# Patient Record
Sex: FEMALE | Race: WHITE | NOT HISPANIC OR LATINO | Employment: FULL TIME | URBAN - METROPOLITAN AREA
[De-identification: names, ages, dates, MRNs, and addresses within clinical notes are randomized per-mention and may not be internally consistent; named-entity substitution may affect disease eponyms.]

---

## 2019-10-25 ENCOUNTER — TELEPHONE (OUTPATIENT)
Dept: FAMILY MEDICINE CLINIC | Facility: CLINIC | Age: 24
End: 2019-10-25

## 2019-11-06 ENCOUNTER — OFFICE VISIT (OUTPATIENT)
Dept: FAMILY MEDICINE CLINIC | Facility: CLINIC | Age: 24
End: 2019-11-06

## 2019-11-06 VITALS
SYSTOLIC BLOOD PRESSURE: 118 MMHG | DIASTOLIC BLOOD PRESSURE: 80 MMHG | HEIGHT: 69 IN | WEIGHT: 135 LBS | BODY MASS INDEX: 19.99 KG/M2 | OXYGEN SATURATION: 99 % | HEART RATE: 76 BPM

## 2019-11-06 DIAGNOSIS — F33.0 MILD EPISODE OF RECURRENT MAJOR DEPRESSIVE DISORDER (HCC): ICD-10-CM

## 2019-11-06 DIAGNOSIS — Z00.00 ROUTINE ADULT HEALTH MAINTENANCE: Primary | ICD-10-CM

## 2019-11-06 PROBLEM — Z91.018 FOOD ALLERGY: Status: ACTIVE | Noted: 2019-11-06

## 2019-11-06 PROBLEM — J30.1 SEASONAL ALLERGIC RHINITIS DUE TO POLLEN: Status: ACTIVE | Noted: 2019-11-06

## 2019-11-06 PROCEDURE — 99385 PREV VISIT NEW AGE 18-39: CPT | Performed by: FAMILY MEDICINE

## 2019-11-06 RX ORDER — FLUTICASONE PROPIONATE 50 MCG
1 SPRAY, SUSPENSION (ML) NASAL DAILY
COMMUNITY

## 2019-11-06 RX ORDER — ESCITALOPRAM OXALATE 10 MG/1
10 TABLET ORAL DAILY
Qty: 30 TABLET | Refills: 5 | Status: SHIPPED | OUTPATIENT
Start: 2019-11-06 | End: 2020-04-26

## 2019-11-06 RX ORDER — POLYETHYLENE GLYCOL 3350 17 G/17G
17 POWDER, FOR SOLUTION ORAL DAILY PRN
Qty: 1 G | Refills: 0
Start: 2019-11-06

## 2019-11-06 NOTE — PROGRESS NOTES
"  Chief Complaint   Patient presents with   • Annual Exam       Subjective   Gudelia Estrella is a 24 y.o. female and is here for a yearly physical exam. The patient reports problems - depression is worse.      Depression: Patient complains of depression. She complains of depressed mood. Onset was approximately 10 years ago, unchanged since that time.  She denies current suicidal and homicidal plan or intent.   Family history significant for depression.Possible organic causes contributing are: none.  Risk factors: none Previous treatment includes none and individual therapy. She complains of the following side effects from the treatment: none   Had depression since high school  Is seeing a therapist for past few years    Now   Working on her masters..    Do you take any herbs or supplements that were not prescribed by a doctor? yes. If so, these will be added to active medication list.    The following portions of the patient's history were reviewed and updated as appropriate: allergies, current medications, past family history, past medical history, past social history, past surgical history and problem list.    Social and Family and Surgical History reviewed and updated today, see Rooming tab.    Health History, Preventive Measures and Vaccination flow sheets reviewed and updated today.    Patient's current medical chart in Epic; including previous office notes, imaging, labs, specialist's evaluation either in notes or in Media tab reviewed today.    Other pertinent medical information also reviewed thru Care Everywhere function is also reviewed today.    Review of Systems  Review of Systems  A comprehensive review of systems was negative except for: Allergic/Immunologic: positive for hay fever    Vitals:    11/06/19 1028   BP: 118/80   BP Location: Left arm   Patient Position: Sitting   Cuff Size: Adult   Pulse: 76   SpO2: 99%   Weight: 61.2 kg (135 lb)   Height: 175.3 cm (69\")       General " Appearance:  Alert, cooperative, no distress, appears stated age   Head:  Normocephalic, without obvious abnormality, atraumatic   Eyes:  PERRL, conjunctiva/corneas clear, EOM's intact.   Ears:  Normal TM's and external ear canals, both ears   Nose: Nares normal, septum midline, mucosa normal, no drainage or sinus tenderness   Throat: Lips, mucosa, and tongue normal; teeth and gums normal   Neck: Supple, symmetrical, trachea midline, no adenopathy;   thyroid: No enlargement/tenderness/nodules; no carotid  bruit   Back:  Symmetric, no curvature, ROM normal, no CVA tenderness   Lungs:  Clear to auscultation bilaterally, respirations unlabored   Chest wall:  No tenderness or deformity   Heart:  Regular rate and rhythm, S1 and S2 normal, no murmur, rub or gallop   Abdomen:  Soft, non-tender, bowel sounds active all four quadrants,   no masses, no organomegaly   Rectal:        Extremities: Extremities normal, atraumatic, no cyanosis or edema   Pulses: 2+ and symmetric all extremities   Skin: Skin color, texture, turgor normal, no rashes or lesions   Lymph nodes: Cervical, supraclavicular, and axillary nodes normal   Neurologic: CNII-XII intact. Normal strength, sensation and reflexes   throughout            Assessment/Plan   Healthy female exam.  Gudelia was seen today for annual exam.    Diagnoses and all orders for this visit:    Routine adult health maintenance  -     CBC (No Diff)  -     Comprehensive Metabolic Panel  -     Lipid Panel  -     TSH    Mild episode of recurrent major depressive disorder (CMS/HCC)  -     CBC (No Diff)  -     Comprehensive Metabolic Panel  -     Lipid Panel  -     TSH  -     escitalopram (LEXAPRO) 10 MG tablet; Take 1 tablet by mouth Daily.    Other orders  -     polyethylene glycol (MIRALAX) powder; Take 17 g by mouth Daily As Needed (.).      1. Start lexapro today  2. Patient Counseling:  --Nutrition: Stressed importance of moderation in sodium/caffeine intake, saturated fat and  cholesterol.  Discussed caloric balance, sufficient intake of fresh fruits, vegetables, fiber, calcium, iron.  --  --Exercise: Stressed the importance of regular exercise.   --Substance Abuse: Discussed cessation/primary prevention of tobacco, alcohol, or other drug use; driving or other dangerous activities under the influence.    --Dental health: Discussed importance of regular tooth brushing, flossing, and dental visits.  -- suggested having eyes and vision checked if needed or past due.  --Immunizations reviewed.  --  3. Discussed the patient's BMI with her.  The BMI is in the acceptable range  4. Follow up in 1 month    There are no Patient Instructions on file for this visit.    Medications Discontinued During This Encounter   Medication Reason   • polyethylene glycol (MIRALAX) powder         Dr. Enrike Frenando MD  [facility] [place], [place]  [facility]

## 2019-11-07 LAB
ALBUMIN SERPL-MCNC: 5 G/DL (ref 3.5–5.5)
ALBUMIN/GLOB SERPL: 2 {RATIO} (ref 1.2–2.2)
ALP SERPL-CCNC: 51 IU/L (ref 39–117)
ALT SERPL-CCNC: 19 IU/L (ref 0–32)
AST SERPL-CCNC: 19 IU/L (ref 0–40)
BILIRUB SERPL-MCNC: 0.3 MG/DL (ref 0–1.2)
BUN SERPL-MCNC: 9 MG/DL (ref 6–20)
BUN/CREAT SERPL: 14 (ref 9–23)
CALCIUM SERPL-MCNC: 9.8 MG/DL (ref 8.7–10.2)
CHLORIDE SERPL-SCNC: 103 MMOL/L (ref 96–106)
CHOLEST SERPL-MCNC: 166 MG/DL (ref 100–199)
CO2 SERPL-SCNC: 24 MMOL/L (ref 20–29)
CREAT SERPL-MCNC: 0.63 MG/DL (ref 0.57–1)
ERYTHROCYTE [DISTWIDTH] IN BLOOD BY AUTOMATED COUNT: 12.4 % (ref 12.3–15.4)
GLOBULIN SER CALC-MCNC: 2.5 G/DL (ref 1.5–4.5)
GLUCOSE SERPL-MCNC: 84 MG/DL (ref 65–99)
HCT VFR BLD AUTO: 38.8 % (ref 34–46.6)
HDLC SERPL-MCNC: 72 MG/DL
HGB BLD-MCNC: 13.9 G/DL (ref 11.1–15.9)
LDLC SERPL CALC-MCNC: 76 MG/DL (ref 0–99)
MCH RBC QN AUTO: 31.4 PG (ref 26.6–33)
MCHC RBC AUTO-ENTMCNC: 35.8 G/DL (ref 31.5–35.7)
MCV RBC AUTO: 88 FL (ref 79–97)
PLATELET # BLD AUTO: 230 X10E3/UL (ref 150–450)
POTASSIUM SERPL-SCNC: 4.2 MMOL/L (ref 3.5–5.2)
PROT SERPL-MCNC: 7.5 G/DL (ref 6–8.5)
RBC # BLD AUTO: 4.43 X10E6/UL (ref 3.77–5.28)
SODIUM SERPL-SCNC: 141 MMOL/L (ref 134–144)
TRIGL SERPL-MCNC: 92 MG/DL (ref 0–149)
TSH SERPL DL<=0.005 MIU/L-ACNC: 2.76 UIU/ML (ref 0.45–4.5)
VLDLC SERPL CALC-MCNC: 18 MG/DL (ref 5–40)
WBC # BLD AUTO: 3.4 X10E3/UL (ref 3.4–10.8)

## 2019-12-19 ENCOUNTER — OFFICE VISIT (OUTPATIENT)
Dept: FAMILY MEDICINE CLINIC | Facility: CLINIC | Age: 24
End: 2019-12-19

## 2019-12-19 VITALS
WEIGHT: 137 LBS | OXYGEN SATURATION: 99 % | HEART RATE: 77 BPM | HEIGHT: 69 IN | BODY MASS INDEX: 20.29 KG/M2 | SYSTOLIC BLOOD PRESSURE: 120 MMHG | DIASTOLIC BLOOD PRESSURE: 62 MMHG

## 2019-12-19 DIAGNOSIS — F33.0 MILD EPISODE OF RECURRENT MAJOR DEPRESSIVE DISORDER (HCC): Primary | ICD-10-CM

## 2019-12-19 PROCEDURE — 99213 OFFICE O/P EST LOW 20 MIN: CPT | Performed by: FAMILY MEDICINE

## 2019-12-19 NOTE — PROGRESS NOTES
Subjective   Gudelia Estrella is a 24 y.o. female who is here for   Chief Complaint   Patient presents with   • Depression     f/u lexapro - no complaints    .     History of Present Illness   Depression: Patient complains of depression. She complains of depressed mood. Onset was approximately several months ago, completely resolved since that time.  She denies current suicidal and homicidal plan or intent.   Family history significant for depression.Possible organic causes contributing are: none.  Risk factors: none Previous treatment includes Lexapro and medication. She complains of the following side effects from the treatment: none.  Feels much better  Happy with lexapro      The following portions of the patient's history were reviewed and updated as appropriate: allergies, current medications, past family history, past medical history, past social history, past surgical history and problem list.    Review of Systems    Objective   Physical Exam   Constitutional: She appears well-developed and well-nourished.   Cardiovascular: Normal rate.   Psychiatric: She has a normal mood and affect.   Nursing note and vitals reviewed.      Assessment/Plan   Gudelia was seen today for depression.    Diagnoses and all orders for this visit:    Mild episode of recurrent major depressive disorder (CMS/HCC)      There are no Patient Instructions on file for this visit.    There are no discontinued medications.     Return in about 6 months (around 6/19/2020) for Annual physical.    Dr. Enrike Fernando  [facility] [place], [place]

## 2020-01-24 ENCOUNTER — TELEPHONE (OUTPATIENT)
Dept: FAMILY MEDICINE CLINIC | Facility: CLINIC | Age: 25
End: 2020-01-24

## 2020-01-24 NOTE — TELEPHONE ENCOUNTER
Patient called to see if you would consider accepting her  as a new patient.  Gudelia was a re-established patient last year.  Her husbands name is Doc 07/22/1992  Ph. [phone]  Thanks

## 2020-02-20 ENCOUNTER — TELEPHONE (OUTPATIENT)
Dept: FAMILY MEDICINE CLINIC | Facility: CLINIC | Age: 25
End: 2020-02-20

## 2020-02-20 NOTE — TELEPHONE ENCOUNTER
Pt was sent home early from work due to 99.9 temperature, fluid in ears and nasal congestion. She is asking if something can be called in. Please advise

## 2020-03-30 ENCOUNTER — TELEPHONE (OUTPATIENT)
Dept: FAMILY MEDICINE CLINIC | Facility: CLINIC | Age: 25
End: 2020-03-30

## 2020-03-30 NOTE — TELEPHONE ENCOUNTER
Pt called complaining that since having to stay at home due to the coronavirus, she is having more anxiety and more trouble sleeping.  Please advise

## 2020-03-31 ENCOUNTER — TELEMEDICINE (OUTPATIENT)
Dept: FAMILY MEDICINE CLINIC | Facility: CLINIC | Age: 25
End: 2020-03-31

## 2020-03-31 DIAGNOSIS — F33.0 MILD EPISODE OF RECURRENT MAJOR DEPRESSIVE DISORDER (HCC): Primary | ICD-10-CM

## 2020-03-31 PROCEDURE — 99213 OFFICE O/P EST LOW 20 MIN: CPT | Performed by: FAMILY MEDICINE

## 2020-03-31 RX ORDER — TRAZODONE HYDROCHLORIDE 50 MG/1
50 TABLET ORAL NIGHTLY
Qty: 30 TABLET | Refills: 2 | Status: SHIPPED | OUTPATIENT
Start: 2020-03-31 | End: 2020-07-10

## 2020-03-31 NOTE — PROGRESS NOTES
Subjective   Gudelia Estrella is a 25 y.o. female who is here for   Chief Complaint   Patient presents with   • Anxiety     cant sleep   .     History of Present Illness   Anxiety: Patient complains of anxiety disorder and sleep disturbance.  She has the following symptoms: difficulty concentrating, insomnia. Onset of symptoms was approximately 1 week ago, gradually worsening since that time. She denies current suicidal and homicidal ideation. Family history significant for anxiety.Possible organic causes contributing are: none. 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  She complains of the following side effects from the treatment: none.        The following portions of the patient's history were reviewed and updated as appropriate: allergies, current medications, past family history, past medical history, past social history, past surgical history and problem list.    Review of Systems    Objective   Physical Exam  None due to video visit  Patient physically appears healthy    Assessment/Plan   Gudelia was seen today for anxiety.    Diagnoses and all orders for this visit:    Mild episode of recurrent major depressive disorder (CMS/[facility])  -     traZODone (DESYREL) 50 MG tablet; Take 1 tablet by mouth Every Night.    stay on lexapro  Add on trazadone    There are no Patient Instructions on file for this visit.    There are no discontinued medications.     No follow-ups on file.    Dr. Enrike Fernando  [facility] [place], [place]

## 2020-04-24 DIAGNOSIS — F33.0 MILD EPISODE OF RECURRENT MAJOR DEPRESSIVE DISORDER (HCC): ICD-10-CM

## 2020-04-26 RX ORDER — ESCITALOPRAM OXALATE 10 MG/1
TABLET ORAL
Qty: 90 TABLET | Refills: 1 | Status: SHIPPED | OUTPATIENT
Start: 2020-04-26 | End: 2020-10-30

## 2020-06-24 NOTE — TELEPHONE ENCOUNTER
Someone outside of this dept has ordered a right mamm. Left msg for mamm  to double check this as it appears she needs left mamm. Video visit tomorrow morning?

## 2020-07-07 DIAGNOSIS — F33.0 MILD EPISODE OF RECURRENT MAJOR DEPRESSIVE DISORDER (HCC): Primary | ICD-10-CM

## 2020-07-09 DIAGNOSIS — F33.0 MILD EPISODE OF RECURRENT MAJOR DEPRESSIVE DISORDER (HCC): ICD-10-CM

## 2020-07-10 RX ORDER — TRAZODONE HYDROCHLORIDE 50 MG/1
TABLET ORAL
Qty: 30 TABLET | Refills: 0 | Status: SHIPPED | OUTPATIENT
Start: 2020-07-10 | End: 2020-07-15 | Stop reason: SDUPTHER

## 2020-07-15 ENCOUNTER — OFFICE VISIT (OUTPATIENT)
Dept: FAMILY MEDICINE CLINIC | Facility: CLINIC | Age: 25
End: 2020-07-15

## 2020-07-15 VITALS
BODY MASS INDEX: 21.18 KG/M2 | DIASTOLIC BLOOD PRESSURE: 84 MMHG | HEART RATE: 82 BPM | OXYGEN SATURATION: 99 % | WEIGHT: 143 LBS | HEIGHT: 69 IN | SYSTOLIC BLOOD PRESSURE: 120 MMHG

## 2020-07-15 DIAGNOSIS — F33.1 MODERATE EPISODE OF RECURRENT MAJOR DEPRESSIVE DISORDER (HCC): Primary | ICD-10-CM

## 2020-07-15 DIAGNOSIS — F33.0 MILD EPISODE OF RECURRENT MAJOR DEPRESSIVE DISORDER (HCC): ICD-10-CM

## 2020-07-15 PROCEDURE — 99213 OFFICE O/P EST LOW 20 MIN: CPT | Performed by: FAMILY MEDICINE

## 2020-07-15 RX ORDER — BUSPIRONE HYDROCHLORIDE 15 MG/1
15 TABLET ORAL 3 TIMES DAILY
Qty: 30 TABLET | Refills: 1 | Status: SHIPPED | OUTPATIENT
Start: 2020-07-15 | End: 2020-08-31 | Stop reason: SDUPTHER

## 2020-07-15 RX ORDER — TRAZODONE HYDROCHLORIDE 50 MG/1
TABLET ORAL
Qty: 30 TABLET | Refills: 5 | Status: SHIPPED | OUTPATIENT
Start: 2020-07-15 | End: 2020-07-20 | Stop reason: SDUPTHER

## 2020-07-15 NOTE — PROGRESS NOTES
Subjective   Gudelia Estrella is a 25 y.o. female who is here for   Chief Complaint   Patient presents with   • Depression     med f/u - suicidal thoughts before menstrual cycle begings    .     History of Present Illness   Depression: Patient complains of depression. She complains of depressed mood, hopelessness and suicidal thoughts without plan. Onset was approximately 10 years ago, unchanged since that time.  She denies current suicidal and homicidal plan or intent.   Family history significant for anxiety and depression.Possible organic causes contributing are: none.  Risk factors: none Previous treatment includes Lexapro and individual therapy and medication. She complains of the following side effects from the treatment: none.  Lexapro over all is working well  But for years right before her menstrual cycle she has thought of death  She is fully aware of these feelings. As are all her family and friends and   She has no plan would not carry out any attempt.  Just asking for help prior to menstrual cycle    trazodone is working well at 1/2 the 50 mg pill. The full pill caused constipation.      The following portions of the patient's history were reviewed and updated as appropriate: allergies, current medications, past family history, past medical history, past social history, past surgical history and problem list.    Review of Systems    Objective   Physical Exam   Constitutional: She appears well-developed and well-nourished.   Cardiovascular: Normal rate.   Pulmonary/Chest: Effort normal.   Neurological: She is alert.   Psychiatric: She has a normal mood and affect.   Nursing note and vitals reviewed.      Assessment/Plan   Gudelia was seen today for depression.    Diagnoses and all orders for this visit:    Moderate episode of recurrent major depressive disorder (CMS/HCC)  -     busPIRone (BUSPAR) 15 MG tablet; Take 1 tablet by mouth 3 (Three) Times a Day.    Mild episode of recurrent major  depressive disorder (CMS/HCC)  -     traZODone (DESYREL) 50 MG tablet; Taking 1/2      There are no Patient Instructions on file for this visit.    Medications Discontinued During This Encounter   Medication Reason   • traZODone (DESYREL) 50 MG tablet Reorder        Return in about 1 month (around 8/15/2020) for new medication follow up.    Dr. Enrike Fernando  [place], [place]

## 2020-07-20 ENCOUNTER — TELEPHONE (OUTPATIENT)
Dept: FAMILY MEDICINE CLINIC | Facility: CLINIC | Age: 25
End: 2020-07-20

## 2020-07-20 DIAGNOSIS — F33.0 MILD EPISODE OF RECURRENT MAJOR DEPRESSIVE DISORDER (HCC): ICD-10-CM

## 2020-07-20 RX ORDER — TRAZODONE HYDROCHLORIDE 50 MG/1
TABLET ORAL
Qty: 30 TABLET | Refills: 5 | Status: SHIPPED | OUTPATIENT
Start: 2020-07-20 | End: 2021-02-26

## 2020-08-31 ENCOUNTER — TELEMEDICINE (OUTPATIENT)
Dept: FAMILY MEDICINE CLINIC | Facility: CLINIC | Age: 25
End: 2020-08-31

## 2020-08-31 DIAGNOSIS — F33.1 MODERATE EPISODE OF RECURRENT MAJOR DEPRESSIVE DISORDER (HCC): ICD-10-CM

## 2020-08-31 PROCEDURE — 99213 OFFICE O/P EST LOW 20 MIN: CPT | Performed by: FAMILY MEDICINE

## 2020-08-31 RX ORDER — BUSPIRONE HYDROCHLORIDE 15 MG/1
15 TABLET ORAL 3 TIMES DAILY
Qty: 30 TABLET | Refills: 5 | Status: SHIPPED | OUTPATIENT
Start: 2020-08-31 | End: 2021-02-26

## 2020-08-31 NOTE — PROGRESS NOTES
Subjective   Gudelia Estrella is a 25 y.o. female who is here for   Chief Complaint   Patient presents with   • Anxiety   • Depression   .     History of Present Illness   This is a Mychart Video medical encounter, occurring during the coronavirus COVID-19 pandemic of 2020.  Medical history from chart is reviewed.  Total face video time: 9  . Total chart time:12    We added Buspar last OV for pre menstrual depressive thought.    And that has worked very well  Wants to stay on the same.      The following portions of the patient's history were reviewed and updated as appropriate: allergies, current medications, past family history, past medical history, past social history, past surgical history and problem list.    Review of Systems    Objective   Physical Exam   Constitutional: No distress.   Pulmonary/Chest: No respiratory distress.   Neurological: She is alert.   Psychiatric: She has a normal mood and affect.       Assessment/Plan   Gudelia was seen today for anxiety and depression.    Diagnoses and all orders for this visit:    Moderate episode of recurrent major depressive disorder (CMS/HCC)  -     busPIRone (BUSPAR) 15 MG tablet; Take 1 tablet by mouth 3 (Three) Times a Day.      There are no Patient Instructions on file for this visit.    Medications Discontinued During This Encounter   Medication Reason   • busPIRone (BUSPAR) 15 MG tablet Reorder        Return in about 6 months (around 2/28/2021).    Dr. Enrike Fernando  [place], [place]

## 2020-10-29 DIAGNOSIS — F33.0 MILD EPISODE OF RECURRENT MAJOR DEPRESSIVE DISORDER (HCC): ICD-10-CM

## 2020-10-30 RX ORDER — ESCITALOPRAM OXALATE 10 MG/1
TABLET ORAL
Qty: 90 TABLET | Refills: 1 | Status: SHIPPED | OUTPATIENT
Start: 2020-10-30 | End: 2021-05-03

## 2021-02-26 ENCOUNTER — TELEMEDICINE (OUTPATIENT)
Dept: FAMILY MEDICINE CLINIC | Facility: CLINIC | Age: 26
End: 2021-02-26

## 2021-02-26 DIAGNOSIS — F33.1 MODERATE EPISODE OF RECURRENT MAJOR DEPRESSIVE DISORDER (HCC): Primary | ICD-10-CM

## 2021-02-26 DIAGNOSIS — Z3A.01 LESS THAN 8 WEEKS GESTATION OF PREGNANCY: ICD-10-CM

## 2021-02-26 PROCEDURE — 99213 OFFICE O/P EST LOW 20 MIN: CPT | Performed by: FAMILY MEDICINE

## 2021-02-26 NOTE — PROGRESS NOTES
Subjective   Gudelia Estrella is a 25 y.o. female who is here for   Chief Complaint   Patient presents with   • Depression   .     History of Present Illness   This is a Mychart Video medical encounter, occurring during the coronavirus COVID-19 pandemic of 2020.  Medical history from chart is reviewed. Audio visual encounter provided through a secure link via Zoom. Patient location is per their choice. Provider location is in my routine medical office in [facility]. Patient has given prior consent for this encounter, via check in process. Regarding EM coding: history will not be as robust and exam will be limited. Most EM coding decisions will be based on MDM and time.  Total face video time, 15 minutes . Total chart time pre and post chartin-20 minutes.    Video follow-up with Sophie today.  She is very excited she and her  are going to have their first baby.  She is within the first 2 months of pregnancy.  We will see her OB next month.  We discussed her medications probably a good idea to go ahead and stop the BuSpar and trazodone.    Since her depression is moderate lets continue Lexapro 10 mg a day for now.    And okay to receive her second COVID-19 vaccine while pregnant.  Risk benefits discussed.  Generally accepted as safe.    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  She would be considered low risk      The following portions of the patient's history were reviewed and updated as appropriate: allergies, current medications, past family history, past medical history, past social history, past surgical history and problem list.    Review of Systems    Objective   Physical Exam  Neurological:      Mental Status: She is alert.   Psychiatric:         Mood and Affect: Mood normal.         Assessment/Plan   Diagnoses and all orders for this visit:    1. Moderate episode of recurrent major depressive disorder (CMS/HCC) (Primary)    2. Less than 8 weeks gestation of  pregnancy      There are no Patient Instructions on file for this visit.    Medications Discontinued During This Encounter   Medication Reason   • busPIRone (BUSPAR) 15 MG tablet *Therapy completed   • traZODone (DESYREL) 50 MG tablet *Therapy completed        Return in about 6 months (around 8/26/2021).    Dr. Enrike Fernando  [place], [place]

## 2021-04-30 DIAGNOSIS — F33.0 MILD EPISODE OF RECURRENT MAJOR DEPRESSIVE DISORDER (HCC): ICD-10-CM

## 2021-05-03 RX ORDER — ESCITALOPRAM OXALATE 10 MG/1
TABLET ORAL
Qty: 90 TABLET | Refills: 3 | Status: SHIPPED | OUTPATIENT
Start: 2021-05-03 | End: 2021-11-09 | Stop reason: DRUGHIGH

## 2021-06-12 ENCOUNTER — HOSPITAL ENCOUNTER (EMERGENCY)
Facility: HOSPITAL | Age: 26
Discharge: HOME | End: 2021-06-13
Attending: EMERGENCY MEDICINE | Admitting: EMERGENCY MEDICINE

## 2021-06-12 VITALS
RESPIRATION RATE: 18 BRPM | SYSTOLIC BLOOD PRESSURE: 127 MMHG | HEART RATE: 81 BPM | DIASTOLIC BLOOD PRESSURE: 78 MMHG | WEIGHT: 155 LBS | TEMPERATURE: 98 F | OXYGEN SATURATION: 98 % | BODY MASS INDEX: 22.96 KG/M2 | HEIGHT: 69 IN

## 2021-06-12 DIAGNOSIS — G43.109 MIGRAINE WITH AURA AND WITHOUT STATUS MIGRAINOSUS, NOT INTRACTABLE: Primary | ICD-10-CM

## 2021-06-12 LAB
BASOPHILS # BLD AUTO: 0.01 10*3/MM3 (ref 0–0.2)
BASOPHILS NFR BLD AUTO: 0.2 % (ref 0–1.5)
BILIRUB UR QL STRIP: NEGATIVE
CLARITY UR: ABNORMAL
COLOR UR: YELLOW
DEPRECATED RDW RBC AUTO: 38.1 FL (ref 37–54)
EOSINOPHIL # BLD AUTO: 0.11 10*3/MM3 (ref 0–0.4)
EOSINOPHIL NFR BLD AUTO: 2.3 % (ref 0.3–6.2)
ERYTHROCYTE [DISTWIDTH] IN BLOOD BY AUTOMATED COUNT: 12 % (ref 12.3–15.4)
GLUCOSE UR STRIP-MCNC: NEGATIVE MG/DL
HCT VFR BLD AUTO: 33 % (ref 34–46.6)
HGB BLD-MCNC: 11.8 G/DL (ref 12–15.9)
HGB UR QL STRIP.AUTO: NEGATIVE
IMM GRANULOCYTES # BLD AUTO: 0.02 10*3/MM3 (ref 0–0.05)
IMM GRANULOCYTES NFR BLD AUTO: 0.4 % (ref 0–0.5)
KETONES UR QL STRIP: NEGATIVE
LEUKOCYTE ESTERASE UR QL STRIP.AUTO: NEGATIVE
LYMPHOCYTES # BLD AUTO: 1.27 10*3/MM3 (ref 0.7–3.1)
LYMPHOCYTES NFR BLD AUTO: 26.5 % (ref 19.6–45.3)
MCH RBC QN AUTO: 31.3 PG (ref 26.6–33)
MCHC RBC AUTO-ENTMCNC: 35.8 G/DL (ref 31.5–35.7)
MCV RBC AUTO: 87.5 FL (ref 79–97)
MONOCYTES # BLD AUTO: 0.41 10*3/MM3 (ref 0.1–0.9)
MONOCYTES NFR BLD AUTO: 8.5 % (ref 5–12)
NEUTROPHILS NFR BLD AUTO: 2.98 10*3/MM3 (ref 1.7–7)
NEUTROPHILS NFR BLD AUTO: 62.1 % (ref 42.7–76)
NITRITE UR QL STRIP: NEGATIVE
NRBC BLD AUTO-RTO: 0 /100 WBC (ref 0–0.2)
PH UR STRIP.AUTO: 8 [PH] (ref 4.5–8)
PLATELET # BLD AUTO: 196 10*3/MM3 (ref 140–450)
PMV BLD AUTO: 9.6 FL (ref 6–12)
PROT UR QL STRIP: NEGATIVE
RBC # BLD AUTO: 3.77 10*6/MM3 (ref 3.77–5.28)
SP GR UR STRIP: 1.02 (ref 1–1.03)
UROBILINOGEN UR QL STRIP: ABNORMAL
WBC # BLD AUTO: 4.8 10*3/MM3 (ref 3.4–10.8)

## 2021-06-12 PROCEDURE — 80053 COMPREHEN METABOLIC PANEL: CPT | Performed by: EMERGENCY MEDICINE

## 2021-06-12 PROCEDURE — 85025 COMPLETE CBC W/AUTO DIFF WBC: CPT | Performed by: EMERGENCY MEDICINE

## 2021-06-12 PROCEDURE — 83615 LACTATE (LD) (LDH) ENZYME: CPT | Performed by: EMERGENCY MEDICINE

## 2021-06-12 PROCEDURE — 96374 THER/PROPH/DIAG INJ IV PUSH: CPT

## 2021-06-12 PROCEDURE — 81003 URINALYSIS AUTO W/O SCOPE: CPT | Performed by: EMERGENCY MEDICINE

## 2021-06-12 PROCEDURE — 25010000002 METOCLOPRAMIDE PER 10 MG: Performed by: EMERGENCY MEDICINE

## 2021-06-12 PROCEDURE — 99283 EMERGENCY DEPT VISIT LOW MDM: CPT

## 2021-06-12 RX ORDER — PRENATAL VIT NO.126/IRON/FOLIC 28MG-0.8MG
TABLET ORAL DAILY
COMMUNITY

## 2021-06-12 RX ORDER — METOCLOPRAMIDE HYDROCHLORIDE 5 MG/ML
10 INJECTION INTRAMUSCULAR; INTRAVENOUS ONCE
Status: COMPLETED | OUTPATIENT
Start: 2021-06-12 | End: 2021-06-12

## 2021-06-12 RX ORDER — FEXOFENADINE HYDROCHLORIDE 60 MG/1
60 TABLET, FILM COATED ORAL DAILY
COMMUNITY

## 2021-06-12 RX ORDER — SODIUM CHLORIDE 9 MG/ML
INJECTION, SOLUTION INTRAVENOUS
Status: COMPLETED
Start: 2021-06-12 | End: 2021-06-13

## 2021-06-12 RX ORDER — SODIUM CHLORIDE 0.9 % (FLUSH) 0.9 %
10 SYRINGE (ML) INJECTION AS NEEDED
Status: DISCONTINUED | OUTPATIENT
Start: 2021-06-12 | End: 2021-06-13 | Stop reason: HOSPADM

## 2021-06-12 RX ADMIN — SODIUM CHLORIDE 500 ML: 9 INJECTION, SOLUTION INTRAVENOUS at 23:40

## 2021-06-12 RX ADMIN — METOCLOPRAMIDE 10 MG: 5 INJECTION, SOLUTION INTRAMUSCULAR; INTRAVENOUS at 23:40

## 2021-06-13 LAB
ALBUMIN SERPL-MCNC: 4.2 G/DL (ref 3.5–5.2)
ALBUMIN/GLOB SERPL: 1.4 G/DL
ALP SERPL-CCNC: 44 U/L (ref 39–117)
ALT SERPL W P-5'-P-CCNC: 16 U/L (ref 1–33)
ANION GAP SERPL CALCULATED.3IONS-SCNC: 10.8 MMOL/L (ref 5–15)
AST SERPL-CCNC: 16 U/L (ref 1–32)
BILIRUB SERPL-MCNC: 0.2 MG/DL (ref 0–1.2)
BUN SERPL-MCNC: 5 MG/DL (ref 6–20)
BUN/CREAT SERPL: 12.5 (ref 7–25)
CALCIUM SPEC-SCNC: 8.8 MG/DL (ref 8.6–10.5)
CHLORIDE SERPL-SCNC: 102 MMOL/L (ref 98–107)
CO2 SERPL-SCNC: 24.2 MMOL/L (ref 22–29)
CREAT SERPL-MCNC: 0.4 MG/DL (ref 0.57–1)
GFR SERPL CREATININE-BSD FRML MDRD: >150 ML/MIN/1.73
GLOBULIN UR ELPH-MCNC: 3 GM/DL
GLUCOSE SERPL-MCNC: 86 MG/DL (ref 65–99)
LDH SERPL-CCNC: 153 U/L (ref 135–214)
POTASSIUM SERPL-SCNC: 3.4 MMOL/L (ref 3.5–5.2)
PROT SERPL-MCNC: 7.2 G/DL (ref 6–8.5)
SODIUM SERPL-SCNC: 137 MMOL/L (ref 136–145)

## 2021-06-13 PROCEDURE — 99282 EMERGENCY DEPT VISIT SF MDM: CPT | Performed by: EMERGENCY MEDICINE

## 2021-06-13 NOTE — ED NOTES
"Pt presents with c.o L hand tingling at 6pm. She says she has a hx of migraines and at 6pm she had a tingling/numbness sensation in her L hand starting in her fingers that moved half way up her forearm. She then had a bright light in her L eye and then felt a migraine start. She says she will usually get an aura before her migraine with what she describes as \"visual disturbances\" but has never had the bright light before. She said she had one more episode at 7pm of the tinngling in her hand lasting for several minutes that then completely resolved. She is 20 weeks pregnant, called her midwife who told her to come to ER for further evaluation. She says her PALACIOS is not bad, rating it a 4/10, and says for her, she has had much more severe migraines and this one \"isnt bad\". She took an extra strength tylenol prior to coming to ED. She is A&Ox4, ambulates with a steady gait. She has no neurological deficits, no n/v/d. No continued numbness/tingling. No cramping or vaginal discharge.      Serena Dhillon, RN  06/12/21 8746    "

## 2021-06-13 NOTE — ED PROVIDER NOTES
Subjective   History of Present Illness  26-year-old  female at 20 weeks with a history of migraine headaches presents to the emergency room complaining of right-sided migraine headache.  She says it is fairly mild but when it started she had a visual aura that was different than what she usually has and she also had some tingling in her left fingers and arm for about 10 minutes.  After the symptoms went away she developed her headache.  Because she had not had the symptoms before she called her nurse midwife who said she should probably go to the emergency room.  This all happened about 1800 today and currently all of her symptoms are gone except for right-sided headache.  The headache is on the right side and throbbing which is like her normal headache, the only difference is its not as severe as usual.  Review of Systems   Constitutional: Negative for chills and fever.   Eyes: Positive for photophobia.   Respiratory: Negative for chest tightness.    Gastrointestinal: Negative for nausea and vomiting.   Neurological: Positive for headaches.        Paresthesia left hand       Past Medical History:   Diagnosis Date   • Anxiety    • Depression        Allergies   Allergen Reactions   • Phenazopyridine Other (See Comments)     Dizziness, Vomitting, High BP       Past Surgical History:   Procedure Laterality Date   • FRENULECTOMY, LINGUAL  childhood       Family History   Problem Relation Age of Onset   • Depression Mother         birthmom   • No Known Problems Other         ADOPTED       Social History     Socioeconomic History   • Marital status:      Spouse name: Doc   • Number of children: 0   • Years of education: Not on file   • Highest education level: Bachelor's degree (e.g., BA, AB, BS)   Tobacco Use   • Smoking status: Never Smoker   • Smokeless tobacco: Never Used   Substance and Sexual Activity   • Alcohol use: Yes     Alcohol/week: 3.0 standard drinks     Types: 3 Standard drinks or  equivalent per week     Comment: social    • Drug use: No   • Sexual activity: Yes     Partners: Male     Birth control/protection: Condom           Objective    ED Triage Vitals   Temp Heart Rate Resp BP SpO2   06/12/21 2316 06/12/21 2301 06/12/21 2301 06/12/21 2301 06/12/21 2301   98 °F (36.7 °C) 81 18 127/78 100 %      Temp src Heart Rate Source Patient Position BP Location FiO2 (%)   -- -- -- -- --              Physical Exam  INITIAL VITAL SIGNS: Reviewed by me.  Pulse ox normal.  Fetal heart tones were 148 per nursing staff  GENERAL: Alert and interactive. No acute distress.  HEAD: Head is normocephalic.  EYES: EOMI. PERRL. No scleral icterus. No conjunctival injection.  ENT: Moist mucous membranes.   NECK: Supple. Full range of motion.  RESPIRATORY: No tachypnea. Clear breath sounds bilaterally. No wheezing. No rales. No rhonchi.  CV: Regular rate and rhythm. No murmurs. No rubs or gallops.  ABDOMEN: Soft, nondistended, nontender, uterus consistent with 20-week pregnancy.  BACK:  No obvious deformity.  EXTREMITIES: No deformity. No clubbing or cyanosis. No edema.   SKIN: Warm and dry. No diaphoresis. No obvious rashes.   NEUROLOGIC: Alert and oriented. Face is symmetric. Speech is normal. Moves all extremities equally. Motor and sensory distally intact.     Procedures           ED Course  ED Course as of Jun 13 0028   Sun Jun 13, 2021   0004 Very well-appearing 26-year-old female at 20 weeks gestation came to the ER because her nurse midwife said that she thought that be the best thing due to having migraine aura that was slightly different than her normal.  She says her normal visual aura is a scintillating scotoma and today it seemed different to her because it was not so glittery and seemed like it was almost more like a big floater.  Denies having a curtain descending, denies having repetitive flashing lights denies feeling like insects are in her vision in addition to the fact that this was brief and when  it went away she developed a headache I have no concern for retinal detachment.  I am also not concerned for stroke as her numbness is described as more of a tingling of the fingers hand and arm that also went away just prior to developing the headache.  She has a normal neuro exam and appears very well.  As she is pregnant I will check some basic labs and urine and give her some fluids as well as some Reglan to help with her headache.  Regarding her headache she says it is the same as her normal migraines on the right side and throbbing only is not quite as bad as usual.    [RO]   0028 Labs are unremarkable, no concern for hellp syndrome and her blood pressure has been fine here.    [RO]      ED Course User Index  [RO] Neftali Morejon MD                                           [facility]    Final diagnoses:   Migraine with aura and without status migrainosus, not intractable       ED Disposition  ED Disposition     ED Disposition Condition Comment    Discharge Good           Enrike Fernando MD  [address] [facility] [address] [phone]    Call   To schedule follow-up appointment as needed         Medication List      No changes were made to your prescriptions during this visit.          Neftali Morejon MD  06/13/21 0028

## 2021-06-13 NOTE — DISCHARGE INSTRUCTIONS
Please follow-up with your primary care doctor as needed.  Please follow-up with your nurse midwife as scheduled.  Return to the emergency room if you develop uncontrollable migraines, chest pain, difficulty breathing, severe abdominal pain, uncontrollable vomiting or for any other concerns.

## 2021-08-17 ENCOUNTER — TELEMEDICINE (OUTPATIENT)
Dept: FAMILY MEDICINE CLINIC | Facility: CLINIC | Age: 26
End: 2021-08-17

## 2021-08-17 DIAGNOSIS — F33.1 MODERATE EPISODE OF RECURRENT MAJOR DEPRESSIVE DISORDER (HCC): Primary | ICD-10-CM

## 2021-08-17 DIAGNOSIS — Z20.822 CLOSE EXPOSURE TO COVID-19 VIRUS: ICD-10-CM

## 2021-08-17 PROCEDURE — 99213 OFFICE O/P EST LOW 20 MIN: CPT | Performed by: FAMILY MEDICINE

## 2021-08-17 NOTE — PROGRESS NOTES
Subjective   Gudelia Estrella is a 26 y.o. female who is here for   Chief Complaint   Patient presents with   • Covid-19 Home Monitoring Video Visit   • Anxiety   .     History of Present Illness   This is a Mychart Video medical encounter, occurring during the coronavirus COVID-19 pandemic of 2020.  Medical history from chart is reviewed. Audio visual encounter provided through a secure link via Zoom. Patient location is per their choice. Provider location is in my routine medical office in [facility]. Patient has given prior consent for this encounter, via check in process. Regarding EM coding: history will not be as robust and exam will be limited. Most EM coding decisions will be based on MDM and time.  Total face video time, 15 minutes . Total chart time pre and post chartin-20 minutes.    Follow-up on her anxiety.  Doing quite well.  She is happily pregnant.  Taking 10 mg Lexapro.    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  She has had the vaccinations.  Her symptoms of Covid included nausea congestion and headache.  He already has appointment with a local [facility] third drive-through Covid swab testing center.  She gets results she will follow  [facility] employee protocol.        The following portions of the patient's history were reviewed and updated as appropriate: allergies, current medications, past family history, past medical history, past social history, past surgical history and problem list.    Review of Systems    Objective   There were no vitals filed for this visit.   Physical Exam  Pulmonary:      Effort: Pulmonary effort is normal.   Neurological:      Mental Status: She is alert.   Psychiatric:         Mood and Affect: Mood normal.         Assessment/Plan   Diagnoses and all orders for this visit:    1. Moderate episode of recurrent major depressive disorder (CMS/HCC) (Primary)    2. Close exposure to COVID-19  virus    Depression anxiety well controlled on 10 mg of Lexapro.  Should be safe to stay on during her entire pregnancy.  Follow-up with COVID-19 testing as scheduled.    There are no Patient Instructions on file for this visit.    There are no discontinued medications.     Return in about 1 year (around 8/17/2022).    Dr. Enrike Fernando  [place], [place]

## 2021-09-28 ENCOUNTER — TELEPHONE (OUTPATIENT)
Dept: FAMILY MEDICINE CLINIC | Facility: CLINIC | Age: 26
End: 2021-09-28

## 2021-09-28 NOTE — TELEPHONE ENCOUNTER
Caller: Gudelia Estrella    Relationship: Self    Best call back number: [phone]    What is the medical concern/diagnosis: CONCERN FOR AREA ON FACE ON LEFT SIDE IN FRONT OF EAR. PATIENT BELIEVES IT TO BE A CYST, PAIN IN AREA     What specialty or service is being requested: PATIENT IS UNSURE BUT THINKS SHE NEEDS TO SEE A DERMATOLOGIST     What is the provider, practice or medical service name: PATIENT HAS NO PREFERENCE

## 2021-09-29 NOTE — TELEPHONE ENCOUNTER
Hub  to share:    Left pt vm that a appointment is needed per , in order for him to make the referral to the correct specialist.

## 2021-11-09 ENCOUNTER — OFFICE VISIT (OUTPATIENT)
Dept: FAMILY MEDICINE CLINIC | Facility: CLINIC | Age: 26
End: 2021-11-09

## 2021-11-09 VITALS
HEIGHT: 69 IN | TEMPERATURE: 97.3 F | HEART RATE: 82 BPM | WEIGHT: 152 LBS | DIASTOLIC BLOOD PRESSURE: 76 MMHG | OXYGEN SATURATION: 97 % | SYSTOLIC BLOOD PRESSURE: 120 MMHG | BODY MASS INDEX: 22.51 KG/M2

## 2021-11-09 DIAGNOSIS — F33.2 SEVERE EPISODE OF RECURRENT MAJOR DEPRESSIVE DISORDER, WITHOUT PSYCHOTIC FEATURES (HCC): Primary | ICD-10-CM

## 2021-11-09 PROBLEM — Z3A.01 LESS THAN 8 WEEKS GESTATION OF PREGNANCY: Status: RESOLVED | Noted: 2021-02-26 | Resolved: 2021-11-09

## 2021-11-09 PROCEDURE — 99213 OFFICE O/P EST LOW 20 MIN: CPT | Performed by: FAMILY MEDICINE

## 2021-11-09 RX ORDER — ESCITALOPRAM OXALATE 20 MG/1
20 TABLET ORAL EVERY MORNING
Qty: 90 TABLET | Refills: 0 | Status: SHIPPED | OUTPATIENT
Start: 2021-11-09 | End: 2022-02-07 | Stop reason: SDUPTHER

## 2021-11-09 RX ORDER — ESCITALOPRAM OXALATE 20 MG/1
TABLET ORAL DAILY
COMMUNITY
Start: 2021-09-13 | End: 2021-11-09 | Stop reason: SDUPTHER

## 2021-11-09 NOTE — PROGRESS NOTES
"  Subjective   Gudelia Estrella is a 26 y.o. female who is here for   Chief Complaint   Patient presents with   • Depression     med f/u- [facility] 6 weeks ago,dose increase lexapro    .     History of Present Illness   This is here in follow-up for her major depressive disorder.  Associated with the suicidal thoughts.  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  Had ominous feeling something was wrong.  She was pregnant at the time.  And had thoughts that she want to casey her car and kill herself that day.  Likely she reached out to friends at work and she was admitted to [facility] under a voluntary admission.  Was there for 5 days did well.  Lexapro was increased from 10-20.  She had the baby who is now healthy.  Healthy boy; she is now breast-feeding.  Feeling better.  She is on maternity leave  No further suicidal thoughts      The following portions of the patient's history were reviewed and updated as appropriate: allergies, current medications, past family history, past medical history, past social history, past surgical history and problem list.    Review of Systems    Objective   Vitals:    11/09/21 1331   BP: 120/76   BP Location: Left arm   Patient Position: Sitting   Cuff Size: Adult   Pulse: 82   Temp: 97.3 °F (36.3 °C)   TempSrc: Temporal   SpO2: 97%   Weight: 68.9 kg (152 lb)   Height: 175.3 cm (69\")      Physical Exam     Current outpatient and discharge medications have been reconciled for the patient.  Reviewed by: Enrike Fernando MD      Assessment/Plan   Diagnoses and all orders for this visit:    1. Severe episode of recurrent major depressive disorder, without psychotic features (HCC) (Primary)  -     escitalopram (LEXAPRO) 20 MG tablet; Take 1 tablet by mouth Every Morning.  Dispense: 90 tablet; Refill: 0      There are no Patient Instructions on file for this visit.    Medications Discontinued During This Encounter   Medication Reason   • Prenatal Vit-Fe " Fumarate-FA (PX PRENATAL MULTIVITAMINS PO) Duplicate order   • escitalopram (LEXAPRO) 10 MG tablet Dose adjustment   • escitalopram (LEXAPRO) 20 MG tablet Reorder        No follow-ups on file.    Dr. Enrike Fernando  [place], [place]

## 2022-02-07 ENCOUNTER — OFFICE VISIT (OUTPATIENT)
Dept: FAMILY MEDICINE CLINIC | Facility: CLINIC | Age: 27
End: 2022-02-07

## 2022-02-07 VITALS
WEIGHT: 161 LBS | OXYGEN SATURATION: 98 % | HEART RATE: 71 BPM | DIASTOLIC BLOOD PRESSURE: 80 MMHG | HEIGHT: 69 IN | TEMPERATURE: 97.3 F | BODY MASS INDEX: 23.85 KG/M2 | SYSTOLIC BLOOD PRESSURE: 120 MMHG

## 2022-02-07 DIAGNOSIS — F33.2 SEVERE EPISODE OF RECURRENT MAJOR DEPRESSIVE DISORDER, WITHOUT PSYCHOTIC FEATURES: ICD-10-CM

## 2022-02-07 DIAGNOSIS — F33.1 MODERATE EPISODE OF RECURRENT MAJOR DEPRESSIVE DISORDER: ICD-10-CM

## 2022-02-07 DIAGNOSIS — K58.1 IRRITABLE BOWEL SYNDROME WITH CONSTIPATION: Primary | ICD-10-CM

## 2022-02-07 PROCEDURE — 99214 OFFICE O/P EST MOD 30 MIN: CPT | Performed by: FAMILY MEDICINE

## 2022-02-07 RX ORDER — ESCITALOPRAM OXALATE 20 MG/1
20 TABLET ORAL EVERY MORNING
Qty: 90 TABLET | Refills: 3 | Status: SHIPPED | OUTPATIENT
Start: 2022-02-07 | End: 2023-03-06

## 2022-02-07 RX ORDER — BUSPIRONE HYDROCHLORIDE 15 MG/1
15 TABLET ORAL 2 TIMES DAILY PRN
Qty: 30 TABLET | Refills: 1 | Status: SHIPPED | OUTPATIENT
Start: 2022-02-07 | End: 2023-03-25

## 2022-02-07 RX ORDER — SACCHAROMYCES BOULARDII 250 MG
250 CAPSULE ORAL 2 TIMES DAILY
Qty: 60 CAPSULE | Refills: 5 | Status: SHIPPED | OUTPATIENT
Start: 2022-02-07 | End: 2022-09-27

## 2022-02-07 NOTE — PROGRESS NOTES
"  Subjective   Gudelia Estrella is a 26 y.o. female who is here for   Chief Complaint   Patient presents with   • Depression   • Irritable Bowel Syndrome   .     History of Present Illness    follow-up on depression.  Generally fairly stable on 20 mg of Lexapro.  Is very happy with her 3-month-old son.  She is back to work as a teacher.  Grandparents are watching the baby.  Sleeping well.  Is breast-feeding part-time.  Still having some intrusive thoughts in the evening sometimes.  We will restart BuSpar.    Her IBS with constipation is worsening.  Infrequent BMs weekly.  She still taking MiraLAX.      The following portions of the patient's history were reviewed and updated as appropriate: allergies, current medications, past family history, past medical history, past social history, past surgical history and problem list.    Review of Systems    Objective   Vitals:    02/07/22 0809   BP: 120/80   BP Location: Left arm   Patient Position: Sitting   Cuff Size: Adult   Pulse: 71   Temp: 97.3 °F (36.3 °C)   TempSrc: Temporal   SpO2: 98%   Weight: 73 kg (161 lb)   Height: 175.3 cm (69\")      Physical Exam  Abdominal:      General: There is no distension.   Psychiatric:         Mood and Affect: Mood normal.         Assessment/Plan   Diagnoses and all orders for this visit:    1. Irritable bowel syndrome with constipation (Primary)  -     saccharomyces boulardii (Florastor) 250 MG capsule; Take 1 capsule by mouth 2 (Two) Times a Day. Indications: IBS  Dispense: 60 capsule; Refill: 5    2. Moderate episode of recurrent major depressive disorder (HCC)  -     busPIRone (BUSPAR) 15 MG tablet; Take 1 tablet by mouth 2 (Two) Times a Day As Needed (depressive thoughts).  Dispense: 30 tablet; Refill: 1    3. Severe episode of recurrent major depressive disorder, without psychotic features (HCC)  -     escitalopram (LEXAPRO) 20 MG tablet; Take 1 tablet by mouth Every Morning.  Dispense: 90 tablet; Refill: 3    Continue " Lexapro  Add on BuSpar  Restart her probiotics for IBS with constipation    There are no Patient Instructions on file for this visit.    Medications Discontinued During This Encounter   Medication Reason   • escitalopram (LEXAPRO) 20 MG tablet Reorder        No follow-ups on file.    Dr. Enrike Fernando  [place], [place]

## 2022-03-07 ENCOUNTER — OFFICE VISIT (OUTPATIENT)
Dept: FAMILY MEDICINE CLINIC | Facility: CLINIC | Age: 27
End: 2022-03-07

## 2022-03-07 VITALS
HEIGHT: 69 IN | TEMPERATURE: 97.5 F | WEIGHT: 168 LBS | BODY MASS INDEX: 24.88 KG/M2 | SYSTOLIC BLOOD PRESSURE: 120 MMHG | OXYGEN SATURATION: 97 % | HEART RATE: 71 BPM | DIASTOLIC BLOOD PRESSURE: 80 MMHG

## 2022-03-07 DIAGNOSIS — F33.3 SEVERE EPISODE OF RECURRENT MAJOR DEPRESSIVE DISORDER, WITH PSYCHOTIC FEATURES: Primary | ICD-10-CM

## 2022-03-07 DIAGNOSIS — K58.1 IRRITABLE BOWEL SYNDROME WITH CONSTIPATION: ICD-10-CM

## 2022-03-07 PROCEDURE — 99213 OFFICE O/P EST LOW 20 MIN: CPT | Performed by: FAMILY MEDICINE

## 2022-03-07 NOTE — PROGRESS NOTES
"  Subjective   Gudelia Estrella is a 26 y.o. female who is here for   Chief Complaint   Patient presents with   • Depression   • Irritable Bowel Syndrome   .     History of Present Illness     Depression is still present.  Taking the BuSpar twice a day which seems to help.  Most days is well controlled.  She did have 1 day since we spoke where she woke up with severe depression and suicidal thoughts.  She took a day off from work reached out to family and friends.  By the next day she was feeling better.  She is continuing to see her therapist via online.  But has reached out and has a new therapist who she can meet in person which starts in 2 weeks    Doing well on the probiotics.  But the brand-name bosentan was $50 a month.  So we discussed other over-the-counter probiotic which may be helpful.  She is having 1 BM a day.  Sometimes she has a small amount of bright red blood on the toilet paper when she wipes.        The following portions of the patient's history were reviewed and updated as appropriate: allergies, current medications, past family history, past medical history, past social history, past surgical history and problem list.    Review of Systems    Objective   Vitals:    03/07/22 1538   BP: 120/80   BP Location: Left arm   Patient Position: Sitting   Cuff Size: Adult   Pulse: 71   Temp: 97.5 °F (36.4 °C)   SpO2: 97%   Weight: 76.2 kg (168 lb)   Height: 175.3 cm (69\")      Physical Exam  Vitals reviewed.   Cardiovascular:      Rate and Rhythm: Normal rate.   Pulmonary:      Effort: Pulmonary effort is normal.   Neurological:      Mental Status: She is alert.   Psychiatric:         Mood and Affect: Mood normal.         Assessment/Plan   Diagnoses and all orders for this visit:    1. Severe episode of recurrent major depressive disorder, with psychotic features (HCC) (Primary)    2. Irritable bowel syndrome with constipation    Stay on medicines, no changes.  We will see her back in 6 " months    There are no Patient Instructions on file for this visit.    There are no discontinued medications.     No follow-ups on file.    Dr. Enrike Fernando  [place], [place]

## 2022-04-19 DIAGNOSIS — K58.1 IRRITABLE BOWEL SYNDROME WITH CONSTIPATION: ICD-10-CM

## 2022-04-19 RX ORDER — SACCHAROMYCES BOULARDII 250 MG
250 CAPSULE ORAL 2 TIMES DAILY
Qty: 60 CAPSULE | Refills: 5 | OUTPATIENT
Start: 2022-04-19

## 2022-09-26 DIAGNOSIS — K58.1 IRRITABLE BOWEL SYNDROME WITH CONSTIPATION: ICD-10-CM

## 2022-09-27 RX ORDER — SACCHAROMYCES BOULARDII 250 MG
CAPSULE ORAL
Qty: 60 CAPSULE | Refills: 5 | Status: SHIPPED | OUTPATIENT
Start: 2022-09-27

## 2023-03-05 DIAGNOSIS — F33.2 SEVERE EPISODE OF RECURRENT MAJOR DEPRESSIVE DISORDER, WITHOUT PSYCHOTIC FEATURES: ICD-10-CM

## 2023-03-06 RX ORDER — ESCITALOPRAM OXALATE 20 MG/1
20 TABLET ORAL EVERY MORNING
Qty: 90 TABLET | Refills: 0 | Status: SHIPPED | OUTPATIENT
Start: 2023-03-06

## 2023-03-24 DIAGNOSIS — F33.1 MODERATE EPISODE OF RECURRENT MAJOR DEPRESSIVE DISORDER: ICD-10-CM

## 2023-03-25 RX ORDER — BUSPIRONE HYDROCHLORIDE 15 MG/1
TABLET ORAL
Qty: 30 TABLET | Refills: 5 | Status: SHIPPED | OUTPATIENT
Start: 2023-03-25

## 2023-04-16 DIAGNOSIS — K58.1 IRRITABLE BOWEL SYNDROME WITH CONSTIPATION: ICD-10-CM

## 2023-04-16 RX ORDER — SACCHAROMYCES BOULARDII 250 MG
CAPSULE ORAL
Qty: 60 CAPSULE | Refills: 5 | Status: SHIPPED | OUTPATIENT
Start: 2023-04-16

## 2024-01-02 ENCOUNTER — OFFICE VISIT (OUTPATIENT)
Dept: FAMILY MEDICINE CLINIC | Facility: CLINIC | Age: 29
End: 2024-01-02
Payer: COMMERCIAL

## 2024-01-02 VITALS
WEIGHT: 171 LBS | BODY MASS INDEX: 25.33 KG/M2 | OXYGEN SATURATION: 97 % | SYSTOLIC BLOOD PRESSURE: 118 MMHG | TEMPERATURE: 97.5 F | DIASTOLIC BLOOD PRESSURE: 80 MMHG | HEART RATE: 86 BPM | HEIGHT: 69 IN

## 2024-01-02 DIAGNOSIS — F33.2 SEVERE EPISODE OF RECURRENT MAJOR DEPRESSIVE DISORDER, WITHOUT PSYCHOTIC FEATURES: ICD-10-CM

## 2024-01-02 PROCEDURE — 99213 OFFICE O/P EST LOW 20 MIN: CPT | Performed by: FAMILY MEDICINE

## 2024-01-02 RX ORDER — ESCITALOPRAM OXALATE 20 MG/1
20 TABLET ORAL EVERY MORNING
Qty: 90 TABLET | Refills: 1 | Status: SHIPPED | OUTPATIENT
Start: 2024-01-02

## 2024-01-02 NOTE — PROGRESS NOTES
"  Subjective   Gudelia Estrella is a 28 y.o. female who is here for   Chief Complaint   Patient presents with    Depression   .   Answers submitted by the patient for this visit:  Other (Submitted on 1/2/2024)  Please describe your symptoms.: None  Have you had these symptoms before?: Yes  How long have you been having these symptoms?: 1-4 days  Please list any medications you are currently taking for this condition.: Lexapro  Primary Reason for Visit (Submitted on 1/2/2024)  What is the primary reason for your visit?: Other    History of Present Illness     Patient to follow-up on her depression.  Things are going quite well.  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  All teaching full-time for [employer]  Enjoying her toddler son.  Sleeping well      The following portions of the patient's history were reviewed and updated as appropriate: allergies, current medications, past family history, past medical history, past social history, past surgical history, and problem list.    Review of Systems    Objective   Vitals:    01/02/24 1103   BP: 118/80   Pulse: 86   Temp: 97.5 °F (36.4 °C)   SpO2: 97%   Weight: 77.6 kg (171 lb)   Height: 175.3 cm (69\")      Physical Exam  Vitals reviewed.   Neurological:      Mental Status: She is alert.   Psychiatric:         Mood and Affect: Mood normal.         Assessment & Plan   Diagnoses and all orders for this visit:    1. Severe episode of recurrent major depressive disorder, without psychotic features  -     escitalopram (LEXAPRO) 20 MG tablet; Take 1 tablet by mouth Every Morning.  Dispense: 90 tablet; Refill: 1      There are no Patient Instructions on file for this visit.    Medications Discontinued During This Encounter   Medication Reason    saccharomyces boulardii (FLORASTOR) 250 MG capsule *Therapy completed    escitalopram (LEXAPRO) 20 MG tablet Reorder        No follow-ups on file.    Dr. Enrike Fernando  [facility]  [place], [place]

## 2024-04-09 ENCOUNTER — APPOINTMENT (OUTPATIENT)
Dept: GENERAL RADIOLOGY | Facility: HOSPITAL | Age: 29
End: 2024-04-09
Payer: COMMERCIAL

## 2024-04-09 ENCOUNTER — HOSPITAL ENCOUNTER (EMERGENCY)
Facility: HOSPITAL | Age: 29
Discharge: HOME | End: 2024-04-09
Attending: EMERGENCY MEDICINE | Admitting: EMERGENCY MEDICINE
Payer: COMMERCIAL

## 2024-04-09 VITALS
HEART RATE: 98 BPM | DIASTOLIC BLOOD PRESSURE: 79 MMHG | RESPIRATION RATE: 16 BRPM | HEIGHT: 69 IN | OXYGEN SATURATION: 99 % | TEMPERATURE: 99.9 F | WEIGHT: 163 LBS | BODY MASS INDEX: 24.14 KG/M2 | SYSTOLIC BLOOD PRESSURE: 130 MMHG

## 2024-04-09 DIAGNOSIS — R42 DIZZINESS: ICD-10-CM

## 2024-04-09 DIAGNOSIS — J02.0 STREP PHARYNGITIS: Primary | ICD-10-CM

## 2024-04-09 DIAGNOSIS — R07.81 PLEURITIC CHEST PAIN: ICD-10-CM

## 2024-04-09 DIAGNOSIS — F41.0 PANIC ATTACK: ICD-10-CM

## 2024-04-09 LAB
ALBUMIN SERPL-MCNC: 4.8 G/DL (ref 3.5–5.2)
ALBUMIN/GLOB SERPL: 1.4 G/DL
ALP SERPL-CCNC: 71 U/L (ref 39–117)
ALT SERPL W P-5'-P-CCNC: 18 U/L (ref 1–33)
ANION GAP SERPL CALCULATED.3IONS-SCNC: 13.8 MMOL/L (ref 5–15)
AST SERPL-CCNC: 18 U/L (ref 1–32)
BASOPHILS # BLD AUTO: 0.02 10*3/MM3 (ref 0–0.2)
BASOPHILS NFR BLD AUTO: 0.2 % (ref 0–1.5)
BILIRUB SERPL-MCNC: 0.5 MG/DL (ref 0–1.2)
BUN SERPL-MCNC: 10 MG/DL (ref 6–20)
BUN/CREAT SERPL: 12.8 (ref 7–25)
CALCIUM SPEC-SCNC: 9.4 MG/DL (ref 8.6–10.5)
CHLORIDE SERPL-SCNC: 103 MMOL/L (ref 98–107)
CO2 SERPL-SCNC: 22.2 MMOL/L (ref 22–29)
CREAT SERPL-MCNC: 0.78 MG/DL (ref 0.57–1)
D DIMER PPP FEU-MCNC: 0.3 MCGFEU/ML (ref 0–0.5)
DEPRECATED RDW RBC AUTO: 38.3 FL (ref 37–54)
EGFRCR SERPLBLD CKD-EPI 2021: 104.3 ML/MIN/1.73
EOSINOPHIL # BLD AUTO: 0.01 10*3/MM3 (ref 0–0.4)
EOSINOPHIL NFR BLD AUTO: 0.1 % (ref 0.3–6.2)
ERYTHROCYTE [DISTWIDTH] IN BLOOD BY AUTOMATED COUNT: 13 % (ref 12.3–15.4)
FLUAV RNA RESP QL NAA+PROBE: NOT DETECTED
FLUBV RNA RESP QL NAA+PROBE: NOT DETECTED
GLOBULIN UR ELPH-MCNC: 3.5 GM/DL
GLUCOSE SERPL-MCNC: 107 MG/DL (ref 65–99)
HCG SERPL QL: NEGATIVE
HCT VFR BLD AUTO: 35.7 % (ref 34–46.6)
HETEROPH AB SER QL LA: NEGATIVE
HGB BLD-MCNC: 11.8 G/DL (ref 12–15.9)
IMM GRANULOCYTES # BLD AUTO: 0.05 10*3/MM3 (ref 0–0.05)
IMM GRANULOCYTES NFR BLD AUTO: 0.5 % (ref 0–0.5)
LYMPHOCYTES # BLD AUTO: 0.55 10*3/MM3 (ref 0.7–3.1)
LYMPHOCYTES NFR BLD AUTO: 6 % (ref 19.6–45.3)
MCH RBC QN AUTO: 26.9 PG (ref 26.6–33)
MCHC RBC AUTO-ENTMCNC: 33.1 G/DL (ref 31.5–35.7)
MCV RBC AUTO: 81.3 FL (ref 79–97)
MONOCYTES # BLD AUTO: 0.62 10*3/MM3 (ref 0.1–0.9)
MONOCYTES NFR BLD AUTO: 6.8 % (ref 5–12)
NEUTROPHILS NFR BLD AUTO: 7.85 10*3/MM3 (ref 1.7–7)
NEUTROPHILS NFR BLD AUTO: 86.4 % (ref 42.7–76)
NRBC BLD AUTO-RTO: 0 /100 WBC (ref 0–0.2)
PLATELET # BLD AUTO: 218 10*3/MM3 (ref 140–450)
PMV BLD AUTO: 10.6 FL (ref 6–12)
POTASSIUM SERPL-SCNC: 3.6 MMOL/L (ref 3.5–5.2)
PROT SERPL-MCNC: 8.3 G/DL (ref 6–8.5)
RBC # BLD AUTO: 4.39 10*6/MM3 (ref 3.77–5.28)
RSV RNA RESP QL NAA+PROBE: NOT DETECTED
S PYO AG THROAT QL: POSITIVE
SARS-COV-2 RNA RESP QL NAA+PROBE: NOT DETECTED
SODIUM SERPL-SCNC: 139 MMOL/L (ref 136–145)
TROPONIN T SERPL HS-MCNC: <6 NG/L
WBC NRBC COR # BLD AUTO: 9.1 10*3/MM3 (ref 3.4–10.8)

## 2024-04-09 PROCEDURE — 84703 CHORIONIC GONADOTROPIN ASSAY: CPT | Performed by: EMERGENCY MEDICINE

## 2024-04-09 PROCEDURE — 93005 ELECTROCARDIOGRAM TRACING: CPT

## 2024-04-09 PROCEDURE — 86308 HETEROPHILE ANTIBODY SCREEN: CPT | Performed by: EMERGENCY MEDICINE

## 2024-04-09 PROCEDURE — 71046 X-RAY EXAM CHEST 2 VIEWS: CPT

## 2024-04-09 PROCEDURE — 85379 FIBRIN DEGRADATION QUANT: CPT | Performed by: EMERGENCY MEDICINE

## 2024-04-09 PROCEDURE — 99284 EMERGENCY DEPT VISIT MOD MDM: CPT

## 2024-04-09 PROCEDURE — 80053 COMPREHEN METABOLIC PANEL: CPT | Performed by: EMERGENCY MEDICINE

## 2024-04-09 PROCEDURE — 93005 ELECTROCARDIOGRAM TRACING: CPT | Performed by: EMERGENCY MEDICINE

## 2024-04-09 PROCEDURE — 87637 SARSCOV2&INF A&B&RSV AMP PRB: CPT | Performed by: EMERGENCY MEDICINE

## 2024-04-09 PROCEDURE — 87880 STREP A ASSAY W/OPTIC: CPT | Performed by: EMERGENCY MEDICINE

## 2024-04-09 PROCEDURE — 93010 ELECTROCARDIOGRAM REPORT: CPT | Performed by: INTERNAL MEDICINE

## 2024-04-09 PROCEDURE — 85025 COMPLETE CBC W/AUTO DIFF WBC: CPT | Performed by: EMERGENCY MEDICINE

## 2024-04-09 PROCEDURE — 84484 ASSAY OF TROPONIN QUANT: CPT | Performed by: EMERGENCY MEDICINE

## 2024-04-09 RX ORDER — SODIUM CHLORIDE 0.9 % (FLUSH) 0.9 %
10 SYRINGE (ML) INJECTION AS NEEDED
Status: DISCONTINUED | OUTPATIENT
Start: 2024-04-09 | End: 2024-04-09 | Stop reason: HOSPADM

## 2024-04-09 RX ORDER — CEPHALEXIN 500 MG/1
500 CAPSULE ORAL ONCE
Status: COMPLETED | OUTPATIENT
Start: 2024-04-09 | End: 2024-04-09

## 2024-04-09 RX ORDER — ACETAMINOPHEN 500 MG
1000 TABLET ORAL ONCE
Status: COMPLETED | OUTPATIENT
Start: 2024-04-09 | End: 2024-04-09

## 2024-04-09 RX ORDER — ACETAMINOPHEN 500 MG
TABLET ORAL
Status: COMPLETED
Start: 2024-04-09 | End: 2024-04-09

## 2024-04-09 RX ORDER — CEPHALEXIN 500 MG/1
500 CAPSULE ORAL 4 TIMES DAILY
Qty: 40 CAPSULE | Refills: 0 | Status: SHIPPED | OUTPATIENT
Start: 2024-04-09 | End: 2024-04-19

## 2024-04-09 RX ADMIN — Medication 1000 MG: at 20:08

## 2024-04-09 RX ADMIN — ACETAMINOPHEN 1000 MG: 500 TABLET ORAL at 20:08

## 2024-04-09 RX ADMIN — CEPHALEXIN 500 MG: 500 CAPSULE ORAL at 21:42

## 2024-04-09 NOTE — ED PROVIDER NOTES
"Subjective     History provided by:  Patient    History of Present Illness    Chief complaint: Sore throat and nasal congestion and chest pain and panic attack    Location: Upper respiratory tract.  Pain in both lungs to breathe.    Quality/Severity: Patient reports a sore throat that is moderately severe.  She has a runny nose and congestion that was initially clear and is now changed to yellow.  She has been sneezing \"a lot\".  She states that it hurts in both lungs to breathe.  She also reports feeling dizzy and lightheaded.  She states she has had panic attacks where \"a wave just comes over me\".    Timing/Onset: Started this morning with a sore throat.    Modifying Factors: Breathing causes sharp pain in her lungs.  Swallowing causes discomfort in throat.    Associated symptoms: Patient is panicking.    Narrative: The patient is a 29-year-old white female who states she woke this morning with a sore throat.  The sore throat was followed by runny nose and nasal congestion that was initially clear that had changed by the afternoon to yellow.  She denies a cough.  She reports a lot of sneezing.  She states she started to panic at work.  She states that her send her lungs to breathe.  The patient states that she has had frequent bouts of strep throat in the past.  Her child currently has RSV.  She is a non-smoker.  She has not been sexually active in a month.  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    Review of Systems  Past Medical History:   Diagnosis Date    Anxiety     Depression      /79   Pulse 98   Temp 99.9 °F (37.7 °C) (Oral)   Resp 16   Ht 175.3 cm (69\")   Wt 73.9 kg (163 lb)   LMP 04/01/2024 (Approximate)   SpO2 99%   BMI 24.07 kg/m²     Past Medical History:   Diagnosis Date    Anxiety     Depression        Allergies   Allergen Reactions    Corn GI Intolerance    Nuts GI Intolerance and Other (See Comments)     TREENUTS  TREENUTS    Pt said that she is allergy to Corn, Wheat, Soy, " Peanuts, Tree nuts, and shellfish.  Pt said that she is allergy to Corn, Wheat, Soy, Peanuts, Tree nuts, and shellfish.      Phenazopyridine Other (See Comments)     Dizziness, Vomitting, High BP    Shellfish Allergy GI Intolerance    Soy Allergy GI Intolerance    Wheat GI Intolerance       Past Surgical History:   Procedure Laterality Date    FRENULECTOMY, LINGUAL  childhood       Family History   Problem Relation Age of Onset    Depression Mother         birthmom    No Known Problems Other         ADOPTED       Social History     Socioeconomic History    Marital status:      Spouse name: Doc    Number of children: 1    Highest education level: Bachelor's degree (e.g., BA, AB, BS)   Tobacco Use    Smoking status: Never    Smokeless tobacco: Never   Vaping Use    Vaping status: Never Used   Substance and Sexual Activity    Alcohol use: Yes     Alcohol/week: 3.0 standard drinks of alcohol     Types: 3 Standard drinks or equivalent per week     Comment: social     Drug use: No    Sexual activity: Yes     Partners: Male     Birth control/protection: Condom           Objective   Physical Exam  Vitals and nursing note reviewed.   Constitutional:       Appearance: She is well-developed. She is not ill-appearing, toxic-appearing or diaphoretic.      Comments: The patient appears panicky.  She does not appear toxic.  Review of her vital signs: She is afebrile with a temperature of 99.9, respirations are normal 16 with a normal oxygen saturation 100% on room air, heart rate 93, blood pressure slightly elevated 147/92.   HENT:      Head: Normocephalic and atraumatic.      Nose: Nose normal.      Mouth/Throat:      Mouth: Mucous membranes are moist.      Pharynx: Oropharynx is clear.      Comments: The patient has he has erythema and mucosal edema of the tonsils and the uvula consistent with a viral pharyngitis.  No exudates.  No asymmetry of the tonsils.  Eyes:      General: No scleral icterus.        Right eye: No  discharge.         Left eye: No discharge.      Conjunctiva/sclera: Conjunctivae normal.      Pupils: Pupils are equal, round, and reactive to light.   Neck:      Thyroid: No thyromegaly.      Vascular: No JVD.   Cardiovascular:      Rate and Rhythm: Normal rate and regular rhythm.      Heart sounds: Normal heart sounds. No murmur heard.  Pulmonary:      Effort: Pulmonary effort is normal.      Breath sounds: Normal breath sounds. No wheezing, rhonchi or rales.   Chest:      Chest wall: No tenderness.   Abdominal:      General: Bowel sounds are normal. There is no distension.      Palpations: Abdomen is soft.      Tenderness: There is no abdominal tenderness. There is no guarding.   Musculoskeletal:         General: No tenderness or deformity. Normal range of motion.      Cervical back: Normal range of motion and neck supple.   Lymphadenopathy:      Cervical: No cervical adenopathy.   Skin:     General: Skin is warm and dry.      Capillary Refill: Capillary refill takes less than 2 seconds.      Coloration: Skin is not pale.      Findings: No erythema or rash.   Neurological:      General: No focal deficit present.      Mental Status: She is alert and oriented to person, place, and time.      Cranial Nerves: No cranial nerve deficit.      Coordination: Coordination normal.      Comments: No focal motor sensory deficit   Psychiatric:      Comments: The patient has a very anxious and panicky affect.  She answers questions appropriately.         Procedures           ED Course  ED Course as of 04/09/24 2248 Tue Apr 09, 2024   2097 My interpretation of the patient's EKG tracing performed at 18: 29 is normal sinus rhythm with a rate of 87, normal axis, 1 mm of ST segment depression in the lateral chest leads and a slight amount of ST segment elevation in V1 and V2 that is a normal variant.  There is no ectopy.  Normal R wave transition.  Normal UT and QT intervals.  No old tracings available for comparison. [TP]   2100  Review of the patient's laboratory studies: The patient's rapid strep screen is positive.  COVID and influenza PCR's are negative.  RSV PCR is negative.  High sensitive troponin is negative.  D-dimer is negative.  Monospot is negative.  Beta-hCG is negative.  CBC has a normal white count of 9.1 with a left shift.  Hemoglobin slightly low at 11.8 with a normal hematocrit 35.7.  CMP has normal electrolytes and normal renal function test. [TP]   2102 The patient's chest x-ray was interpreted by me as negative for acute cardiopulmonary findings. [TP]   2102 The patient was administered Tylenol 1 g p.o. for pain. [TP]   2102 The patient states that amoxicillin has not agreed with her IBS in the past.  She request something different to treat her strep pharyngitis.  LA Bicillin is on backorder.  The patient will be treated with Keflex. [TP]      ED Course User Index  [TP] Giovani Ann MD                                             Medical Decision Making  My differential diagnosis for chest pain includes but is not limited to:  Muscle strain, costochondritis, myositis, pleurisy, rib fracture, intercostal neuritis, herpes zoster, tumor, myocardial infarction, coronary syndrome, unstable angina, angina, aortic dissection, mitral valve prolapse, pericarditis, palpitations, pulmonary embolus, pneumonia, pneumothorax, lung cancer, GERD, esophagitis, esophageal spasm   My differential diagnosis for sore throat includes but is not limited to viral pharyngitis, strep pharyngitis, mononucleosis, epiglottitis, esophageal abrasion, peritonsillar abscess, retropharyngeal abscess, tonsillitis, scarlet fever, viral syndrome, COVID-19 and herpetic gingival stomatitis       Problems Addressed:  Dizziness: complicated acute illness or injury  Panic attack: complicated acute illness or injury  Pleuritic chest pain: complicated acute illness or injury  Strep pharyngitis: complicated acute illness or injury    Amount and/or Complexity of  Data Reviewed  Labs: ordered. Decision-making details documented in ED Course.  Radiology: ordered. Decision-making details documented in ED Course.  ECG/medicine tests: ordered and independent interpretation performed. Decision-making details documented in ED Course.    Risk  OTC drugs.  Prescription drug management.        Final diagnoses:   Strep pharyngitis   Dizziness   Panic attack   Pleuritic chest pain       ED Disposition  ED Disposition       ED Disposition   Discharge    Condition   Stable    Comment   --               Enrike Fernando MD  [address] [facility] [id number] [phone]    Schedule an appointment as soon as possible for a visit in 5 days           Medication List        New Prescriptions      cephalexin 500 MG capsule  Commonly known as: KEFLEX  Take 1 capsule by mouth 4 (Four) Times a Day for 10 days.            Stop      polyethylene glycol 17 GM/SCOOP powder  Commonly known as: MIRALAX               Where to Get Your Medications        [id number] [place], [place] - [address] [facility] [address] [facility] [address] - [phone]  - [phone]  [address] [facility] [address] [facility] [address], [facility] [address]      Phone: [phone]   cephalexin 500 MG capsule           Labs Reviewed   RAPID STREP A SCREEN - Abnormal; Notable for the following components:       Result Value    Strep A Ag Positive (*)     All other components within normal limits   COMPREHENSIVE METABOLIC PANEL - Abnormal; Notable for the following components:    Glucose 107 (*)     All other components within normal limits    Narrative:     GFR Normal >60  Chronic Kidney Disease <60  Kidney Failure <15     CBC WITH AUTO DIFFERENTIAL - Abnormal; Notable for the following components:    Hemoglobin 11.8 (*)     Neutrophil % 86.4 (*)     Lymphocyte % 6.0 (*)     Eosinophil % 0.1 (*)     Neutrophils, Absolute 7.85 (*)     Lymphocytes, Absolute 0.55 (*)     All other components within normal limits   COVID-19/FLUA&B/RSV, NP SWAB  "IN TRANSPORT MEDIA 1 HR TAT - Normal    Narrative:     Fact sheet for providers: https://www.fda.gov/media/936266/download    Fact sheet for patients: https://www.fda.gov/media/084466/download    Test performed by PCR.   MONONUCLEOSIS SCREEN - Normal   D-DIMER, QUANTITATIVE - Normal    Narrative:     According to the assay 's published package insert, a normal (<0.50 MCGFEU/mL) D-dimer result in conjunction with a non-high clinical probability assessment, excludes deep vein thrombosis (DVT) and pulmonary embolism (PE) with high sensitivity.    D-dimer values increase with age and this can make VTE exclusion of an older population difficult. To address this, the American College of Physicians, based on best available evidence and recent guidelines, recommends that clinicians use age-adjusted D-dimer thresholds in patients greater than 50 years of age with: a) a low probability of PE who do not meet all Pulmonary Embolism Rule Out Criteria, or b) in those with intermediate probability of PE.   The formula for an age-adjusted D-dimer cut-off is \"age/100\".  For example, a 60 year old patient would have an age-adjusted cut-off of 0.60 MCGFEU/mL and an 80 year old 0.80 MCGFEU/mL.   TROPONIN - Normal    Narrative:     High Sensitive Troponin T Reference Range:  <14.0 ng/L- Negative Female for AMI  <22.0 ng/L- Negative Male for AMI  >=14 - Abnormal Female indicating possible myocardial injury.  >=22 - Abnormal Male indicating possible myocardial injury.   Clinicians would have to utilize clinical acumen, EKG, Troponin, and serial changes to determine if it is an Acute Myocardial Infarction or myocardial injury due to an underlying chronic condition.        HCG, SERUM, QUALITATIVE - Normal   HIGH SENSITIVITIY TROPONIN T 2HR   CBC AND DIFFERENTIAL    Narrative:     The following orders were created for panel order CBC & Differential.  Procedure                               Abnormality         Status               "       ---------                               -----------         ------                     CBC Auto Differential[[id number]]        Abnormal            Final result                 Please view results for these tests on the individual orders.     XR Chest 2 View   Final Result   Impression:   No acute cardiopulmonary abnormality.         Electronically Signed: Bebeto Yee MD     4/9/2024 9:00 PM EDT     Workstation ID: [id number]             Medication List        New Prescriptions      cephalexin 500 MG capsule  Commonly known as: KEFLEX  Take 1 capsule by mouth 4 (Four) Times a Day for 10 days.            Stop      polyethylene glycol 17 GM/SCOOP powder  Commonly known as: MIRALAX               Where to Get Your Medications        These medications were sent to [facility] [id number] - [place], [place] - [address] [facility] [address] - [phone]  - [phone]   [address] [facility] [address] [address]      Phone: [phone]   cephalexin 500 MG capsule              Giovani Ann MD  04/09/24 1530

## 2024-04-09 NOTE — Clinical Note
[place]  [facility] EMERGENCY DEPARTMENT  [address]   [place] [address]  Phone: [phone]    Gudelia Estrella was seen and treated in our emergency department on 4/9/2024.  She may return to school on 04/11/2024.          Thank you for choosing [facility].    Giovani Ann MD

## 2024-04-10 LAB
QT INTERVAL: 353 MS
QTC INTERVAL: 427 MS

## 2024-04-22 ENCOUNTER — OFFICE VISIT (OUTPATIENT)
Dept: FAMILY MEDICINE CLINIC | Facility: CLINIC | Age: 29
End: 2024-04-22
Payer: COMMERCIAL

## 2024-04-22 VITALS
OXYGEN SATURATION: 99 % | TEMPERATURE: 97.7 F | HEART RATE: 80 BPM | WEIGHT: 176 LBS | HEIGHT: 69 IN | DIASTOLIC BLOOD PRESSURE: 70 MMHG | SYSTOLIC BLOOD PRESSURE: 110 MMHG | BODY MASS INDEX: 26.07 KG/M2

## 2024-04-22 DIAGNOSIS — J31.2 CHRONIC SORE THROAT: Primary | ICD-10-CM

## 2024-04-22 PROCEDURE — 99213 OFFICE O/P EST LOW 20 MIN: CPT | Performed by: FAMILY MEDICINE

## 2024-04-22 RX ORDER — LEVOCETIRIZINE DIHYDROCHLORIDE 5 MG/1
5 TABLET, FILM COATED ORAL EVERY EVENING
COMMUNITY

## 2024-04-22 NOTE — PROGRESS NOTES
"  Roni Estrella is a 29 y.o. female who is here for   Chief Complaint   Patient presents with    er follow-up     Strep throat  Panic attack   .     History of Present Illness   4 strep throats since December  Just finished up Keflex.      The following portions of the patient's history were reviewed and updated as appropriate: allergies, current medications, past family history, past medical history, past social history, past surgical history, and problem list.    Review of Systems    Objective   Vitals:    04/22/24 1515   BP: 110/70   Pulse: 80   Temp: 97.7 °F (36.5 °C)   SpO2: 99%   Weight: 79.8 kg (176 lb)   Height: 175.3 cm (69.02\")      Physical Exam  HENT:      Mouth/Throat:      Pharynx: Posterior oropharyngeal erythema present. No oropharyngeal exudate.         Assessment & Plan   Diagnoses and all orders for this visit:    1. Chronic sore throat (Primary)  -     Throat / Upper Respiratory Culture - Swab, Throat      There are no Patient Instructions on file for this visit.    Medications Discontinued During This Encounter   Medication Reason    loratadine (Claritin) 10 MG tablet *Therapy completed        No follow-ups on file.    Dr. Enrike Fernando  [place], [place]    "

## 2024-04-26 ENCOUNTER — PATIENT MESSAGE (OUTPATIENT)
Dept: FAMILY MEDICINE CLINIC | Facility: CLINIC | Age: 29
End: 2024-04-26
Payer: COMMERCIAL

## 2024-04-26 LAB
BACTERIA SPEC RESP CULT: NORMAL
BACTERIA SPEC RESP CULT: NORMAL

## 2024-04-29 NOTE — TELEPHONE ENCOUNTER
From: Gudelia Estrella  To: Enrike Fernando  Sent: 4/26/2024 1:53 PM EDT  Subject: Throat Pain    Hey there Dr. Fernando,    I know that we received a negative throat culture, but I have been dealing with some pain in my tonsils all week. It has been enough that I need Tylenol on some days. Usually, the antibiotics also take the pain, but this time feels different. I am unsure of how I should attack this.    Thanks,  Dr. TOLEDO

## 2024-06-13 ENCOUNTER — TELEPHONE (OUTPATIENT)
Dept: FAMILY MEDICINE CLINIC | Facility: CLINIC | Age: 29
End: 2024-06-13
Payer: COMMERCIAL

## 2024-06-13 NOTE — TELEPHONE ENCOUNTER
HUB  TO RELAY:   Tried to call patient regarding her appointment with Dr. Fernando on 06/26/24.   The provider will be out of the office and we are needing to reschedule.   Patient did not answer, able to detailed VM.

## 2024-06-14 ENCOUNTER — TELEPHONE (OUTPATIENT)
Dept: FAMILY MEDICINE CLINIC | Facility: CLINIC | Age: 29
End: 2024-06-14
Payer: COMMERCIAL

## 2024-06-14 NOTE — TELEPHONE ENCOUNTER
HUB TO RELAY    Tried to call pt to reschedule her appt on 6/26/2024 at 9:30. Dr reese will be out of the office. Pt did not answer I left a vm.

## 2024-06-27 DIAGNOSIS — F33.2 SEVERE EPISODE OF RECURRENT MAJOR DEPRESSIVE DISORDER, WITHOUT PSYCHOTIC FEATURES: ICD-10-CM

## 2024-06-27 RX ORDER — ESCITALOPRAM OXALATE 20 MG/1
20 TABLET ORAL EVERY MORNING
Qty: 30 TABLET | Refills: 0 | Status: SHIPPED | OUTPATIENT
Start: 2024-06-27

## 2024-07-08 ENCOUNTER — OFFICE VISIT (OUTPATIENT)
Dept: FAMILY MEDICINE CLINIC | Facility: CLINIC | Age: 29
End: 2024-07-08
Payer: COMMERCIAL

## 2024-07-08 VITALS
DIASTOLIC BLOOD PRESSURE: 75 MMHG | HEIGHT: 69 IN | OXYGEN SATURATION: 98 % | HEART RATE: 75 BPM | BODY MASS INDEX: 25.92 KG/M2 | TEMPERATURE: 97.3 F | SYSTOLIC BLOOD PRESSURE: 100 MMHG | WEIGHT: 175 LBS

## 2024-07-08 DIAGNOSIS — F33.2 SEVERE EPISODE OF RECURRENT MAJOR DEPRESSIVE DISORDER, WITHOUT PSYCHOTIC FEATURES: ICD-10-CM

## 2024-07-08 PROCEDURE — 99213 OFFICE O/P EST LOW 20 MIN: CPT | Performed by: FAMILY MEDICINE

## 2024-07-08 RX ORDER — BUSPIRONE HYDROCHLORIDE 15 MG/1
15 TABLET ORAL 3 TIMES DAILY PRN
Qty: 30 TABLET | Refills: 5 | Status: SHIPPED | OUTPATIENT
Start: 2024-07-08

## 2024-07-08 RX ORDER — ESCITALOPRAM OXALATE 20 MG/1
20 TABLET ORAL EVERY MORNING
Qty: 90 TABLET | Refills: 3 | Status: SHIPPED | OUTPATIENT
Start: 2024-07-08

## 2024-07-08 NOTE — PROGRESS NOTES
"  Roni Estrella is a 29 y.o. female who is here for   Chief Complaint   Patient presents with    Anxiety    Depression   .     History of Present Illness     Overall anxiety and depression is fairly well-controlled.  Is now on summer break as a teacher.  Enjoying her 2-year-old toddler at home  Had a bad strep throat a few months ago.  The throat pain throat congestion and nasal congestion threw into a panic attack.  She is better now      The following portions of the patient's history were reviewed and updated as appropriate: allergies, current medications, past family history, past medical history, past social history, past surgical history, and problem list.    Review of Systems    Objective   Vitals:    07/08/24 0848   BP: 100/75   Pulse: 75   Temp: 97.3 °F (36.3 °C)   SpO2: 98%   Weight: 79.4 kg (175 lb)   Height: 175.3 cm (69.02\")      Physical Exam  Vitals reviewed.   Neurological:      Mental Status: She is alert.   Psychiatric:         Mood and Affect: Mood normal.         Assessment & Plan   Diagnoses and all orders for this visit:    1. Severe episode of recurrent major depressive disorder, without psychotic features  -     escitalopram (LEXAPRO) 20 MG tablet; Take 1 tablet by mouth Every Morning.  Dispense: 90 tablet; Refill: 3  -     busPIRone (BUSPAR) 15 MG tablet; Take 1 tablet by mouth 3 (Three) Times a Day As Needed (anxiety).  Dispense: 30 tablet; Refill: 5      There are no Patient Instructions on file for this visit.    Medications Discontinued During This Encounter   Medication Reason    busPIRone (BUSPAR) 15 MG tablet Reorder    escitalopram (LEXAPRO) 20 MG tablet Reorder        No follow-ups on file.    Dr. Enrike Fernando  [facility] [place], [place]    "

## 2024-08-12 DIAGNOSIS — F33.2 SEVERE EPISODE OF RECURRENT MAJOR DEPRESSIVE DISORDER, WITHOUT PSYCHOTIC FEATURES: ICD-10-CM

## 2024-08-12 RX ORDER — ESCITALOPRAM OXALATE 20 MG/1
20 TABLET ORAL EVERY MORNING
Qty: 90 TABLET | Refills: 3 | Status: SHIPPED | OUTPATIENT
Start: 2024-08-12

## 2024-10-03 ENCOUNTER — TELEPHONE (OUTPATIENT)
Dept: FAMILY MEDICINE CLINIC | Facility: CLINIC | Age: 29
End: 2024-10-03
Payer: COMMERCIAL

## 2024-10-03 NOTE — TELEPHONE ENCOUNTER
HUB TO RELAY:   Tried to call patient regarding their appointment on Friday, January 3rd 2025, with Dr. Fernando.   As of January 1st Dr. Fernando will no longer be working on Fridays & we are needing to reschedule. Patient did not answer, LVM.

## 2024-11-05 ENCOUNTER — TELEPHONE (OUTPATIENT)
Dept: FAMILY MEDICINE CLINIC | Facility: CLINIC | Age: 29
End: 2024-11-05
Payer: COMMERCIAL

## 2024-11-05 NOTE — TELEPHONE ENCOUNTER
Called patient to reschedule her annual physical that was scheduled for Friday 01/03/25.   Patient stated that she recently moved and will be looking for a new PCP & her appointments needed to be cancelled.